# Patient Record
Sex: FEMALE | Race: OTHER | URBAN - METROPOLITAN AREA
[De-identification: names, ages, dates, MRNs, and addresses within clinical notes are randomized per-mention and may not be internally consistent; named-entity substitution may affect disease eponyms.]

---

## 2022-02-03 ENCOUNTER — NEW PATIENT (OUTPATIENT)
Dept: URBAN - METROPOLITAN AREA CLINIC 14 | Facility: CLINIC | Age: 82
End: 2022-02-03

## 2022-02-03 DIAGNOSIS — H25.13: ICD-10-CM

## 2022-02-03 PROCEDURE — 99204 OFFICE O/P NEW MOD 45 MIN: CPT

## 2022-02-03 PROCEDURE — 92136 OPHTHALMIC BIOMETRY: CPT

## 2022-02-03 ASSESSMENT — VISUAL ACUITY
OS_BCVA: 20/40
OD_BCVA: 20/30

## 2022-02-03 ASSESSMENT — TONOMETRY
OS_IOP_MMHG: 18
OD_IOP_MMHG: 13

## 2022-02-03 NOTE — PATIENT DISCUSSION
Discussed IOL options. Pt is interested in being less dependent on glasses. Pt to consider options and follow up for advanced testing.  Leaning towards multifocal vs trifocal.

## 2022-03-04 ENCOUNTER — NEW PATIENT (OUTPATIENT)
Dept: URBAN - METROPOLITAN AREA CLINIC 14 | Facility: CLINIC | Age: 82
End: 2022-03-04

## 2022-03-04 DIAGNOSIS — H25.13: ICD-10-CM

## 2022-03-04 PROCEDURE — 99199ADVT ADVANCED VISION TESTING

## 2022-03-04 ASSESSMENT — KERATOMETRY
OD_AXISANGLE_DEGREES: 102
OS_K2POWER_DIOPTERS: 42.75
OD_K1POWER_DIOPTERS: 43.00
OS_AXISANGLE_DEGREES: 45
OS_K1POWER_DIOPTERS: 42.50
OD_K2POWER_DIOPTERS: 43.25
OS_AXISANGLE2_DEGREES: 135
OD_AXISANGLE2_DEGREES: 12

## 2022-03-04 NOTE — PATIENT DISCUSSION
AVT PERFORMED TODAY. Discussed IOL options. Pt is interested in being less dependent on glasses for all near, intermediate and distance activities. Discussed compromises to distance and near vision with multi-focal and trifocal IOL's. Pt prefers a trifocal IOL and understands she will be at risk of side effects around lights at night.  After discussion the patient chooses to proceed with synergy, goal of plano.  Pt to lubricate and repeat AVT due to inconsistencies in measurements.

## 2022-03-08 ENCOUNTER — ESTABLISHED PATIENT (OUTPATIENT)
Dept: URBAN - METROPOLITAN AREA CLINIC 14 | Facility: CLINIC | Age: 82
End: 2022-03-08

## 2022-03-08 DIAGNOSIS — H25.13: ICD-10-CM

## 2022-03-08 DIAGNOSIS — H52.7: ICD-10-CM

## 2022-03-08 PROCEDURE — 99211PRE PRE OP VISIT

## 2022-03-08 ASSESSMENT — KERATOMETRY
OD_AXISANGLE2_DEGREES: 12
OS_AXISANGLE_DEGREES: 45
OD_AXISANGLE_DEGREES: 102
OS_K1POWER_DIOPTERS: 42.50
OD_K1POWER_DIOPTERS: 43.00
OS_AXISANGLE2_DEGREES: 135
OS_K2POWER_DIOPTERS: 42.75
OD_K2POWER_DIOPTERS: 43.25

## 2022-03-23 ENCOUNTER — POST-OP (OUTPATIENT)
Dept: URBAN - METROPOLITAN AREA CLINIC 14 | Facility: CLINIC | Age: 82
End: 2022-03-23

## 2022-03-23 DIAGNOSIS — H25.11: ICD-10-CM

## 2022-03-23 DIAGNOSIS — Z96.1: ICD-10-CM

## 2022-03-23 PROCEDURE — 99024 POSTOP FOLLOW-UP VISIT: CPT

## 2022-03-23 PROCEDURE — 92136 OPHTHALMIC BIOMETRY: CPT

## 2022-03-23 ASSESSMENT — KERATOMETRY
OD_AXISANGLE2_DEGREES: 12
OD_AXISANGLE_DEGREES: 102
OS_AXISANGLE_DEGREES: 45
OS_K1POWER_DIOPTERS: 42.50
OD_K1POWER_DIOPTERS: 43.00
OD_K2POWER_DIOPTERS: 43.25
OS_AXISANGLE2_DEGREES: 135
OS_K2POWER_DIOPTERS: 42.75

## 2022-03-23 ASSESSMENT — VISUAL ACUITY
OD_BCVA: 20/30
OS_SC: 20/40
OD_SC: 20/40

## 2022-03-23 ASSESSMENT — TONOMETRY
OS_IOP_MMHG: 18
OD_IOP_MMHG: 14

## 2022-03-30 ENCOUNTER — POST-OP (OUTPATIENT)
Dept: URBAN - METROPOLITAN AREA CLINIC 14 | Facility: CLINIC | Age: 82
End: 2022-03-30

## 2022-03-30 DIAGNOSIS — Z96.1: ICD-10-CM

## 2022-03-30 PROCEDURE — 99024 POSTOP FOLLOW-UP VISIT: CPT

## 2022-03-30 ASSESSMENT — VISUAL ACUITY
OD_SC: 20/60
OU_OTHER: 20/25 INT
OU_SC: J2
OS_SC: 20/50

## 2022-03-30 ASSESSMENT — KERATOMETRY
OD_K2POWER_DIOPTERS: 43.25
OS_K2POWER_DIOPTERS: 42.75
OS_AXISANGLE2_DEGREES: 135
OD_K1POWER_DIOPTERS: 43.00
OD_AXISANGLE2_DEGREES: 12
OS_AXISANGLE_DEGREES: 45
OS_K1POWER_DIOPTERS: 42.50
OD_AXISANGLE_DEGREES: 102

## 2022-03-30 ASSESSMENT — TONOMETRY
OD_IOP_MMHG: 23
OS_IOP_MMHG: 15

## 2022-06-21 RX ORDER — CANDESARTAN 32 MG/1
TABLET ORAL
COMMUNITY